# Patient Record
Sex: MALE | Race: WHITE | NOT HISPANIC OR LATINO | URBAN - METROPOLITAN AREA
[De-identification: names, ages, dates, MRNs, and addresses within clinical notes are randomized per-mention and may not be internally consistent; named-entity substitution may affect disease eponyms.]

---

## 2019-11-26 ENCOUNTER — APPOINTMENT (OUTPATIENT)
Age: 24
Setting detail: DERMATOLOGY
End: 2019-11-27

## 2019-11-26 VITALS — HEIGHT: 73 IN | WEIGHT: 200 LBS

## 2019-11-26 DIAGNOSIS — L30.9 DERMATITIS, UNSPECIFIED: ICD-10-CM

## 2019-11-26 PROCEDURE — OTHER TREATMENT REGIMEN: OTHER

## 2019-11-26 PROCEDURE — OTHER FOLLOW UP FOR NEXT VISIT: OTHER

## 2019-11-26 PROCEDURE — OTHER COUNSELING: OTHER

## 2019-11-26 PROCEDURE — 99203 OFFICE O/P NEW LOW 30 MIN: CPT

## 2019-11-26 PROCEDURE — OTHER MIPS QUALITY: OTHER

## 2019-11-26 ASSESSMENT — SEVERITY ASSESSMENT: SEVERITY: MILD TO MODERATE

## 2019-11-26 ASSESSMENT — BSA RASH: BSA RASH: 4

## 2019-11-26 ASSESSMENT — LOCATION SIMPLE DESCRIPTION DERM
LOCATION SIMPLE: CHEST
LOCATION SIMPLE: RIGHT CHEEK
LOCATION SIMPLE: LEFT LOWER BACK
LOCATION SIMPLE: LEFT UPPER BACK
LOCATION SIMPLE: ABDOMEN

## 2019-11-26 ASSESSMENT — LOCATION ZONE DERM
LOCATION ZONE: FACE
LOCATION ZONE: TRUNK

## 2019-11-26 ASSESSMENT — LOCATION DETAILED DESCRIPTION DERM
LOCATION DETAILED: LEFT LATERAL SUPERIOR CHEST
LOCATION DETAILED: LEFT SUPERIOR LATERAL UPPER BACK
LOCATION DETAILED: LEFT SUPERIOR LATERAL MIDBACK
LOCATION DETAILED: RIGHT SUPERIOR CENTRAL MALAR CHEEK
LOCATION DETAILED: LEFT LATERAL ABDOMEN

## 2019-11-26 ASSESSMENT — PAIN INTENSITY VAS: HOW INTENSE IS YOUR PAIN 0 BEING NO PAIN, 10 BEING THE MOST SEVERE PAIN POSSIBLE?: NO PAIN

## 2019-11-26 NOTE — PROCEDURE: TREATMENT REGIMEN
Plan: This appears to be consistent with some form of reactive dermatitis. It is asymptomatic but the topical steroid should lead to resolution. If no improvement patient will follow up for reevaluation and possible biopsy
Samples Given: Bryhali x2 QD, Nahomy info
Detail Level: Zone

## 2019-11-26 NOTE — HPI: RASH
What Type Of Note Output Would You Prefer (Optional)?: Standard Output
How Severe Is Your Rash?: moderate
Is This A New Presentation, Or A Follow-Up?: Rash
Additional History: Patient with a patchy, red, slightly scaly rash primarily on the left abdomen and left back for the past one month. Started small and has expanded. It is completely asymptomatic, sometimes it is more noticeable after exercise or shower. He did have a recent sinusitis and took an antihistamine but otherwise no new medications. No other skin surfaces are affected

## 2020-01-28 ENCOUNTER — APPOINTMENT (OUTPATIENT)
Age: 25
Setting detail: DERMATOLOGY
End: 2020-01-29

## 2020-01-28 VITALS — HEIGHT: 73 IN

## 2020-01-28 DIAGNOSIS — L30.9 DERMATITIS, UNSPECIFIED: ICD-10-CM

## 2020-01-28 DIAGNOSIS — L71.8 OTHER ROSACEA: ICD-10-CM

## 2020-01-28 PROBLEM — H91.90 UNSPECIFIED HEARING LOSS, UNSPECIFIED EAR: Status: ACTIVE | Noted: 2020-01-28

## 2020-01-28 PROCEDURE — 99214 OFFICE O/P EST MOD 30 MIN: CPT

## 2020-01-28 PROCEDURE — OTHER TREATMENT REGIMEN: OTHER

## 2020-01-28 PROCEDURE — OTHER MIPS QUALITY: OTHER

## 2020-01-28 PROCEDURE — OTHER FOLLOW UP FOR NEXT VISIT: OTHER

## 2020-01-28 PROCEDURE — OTHER COUNSELING: OTHER

## 2020-01-28 ASSESSMENT — LOCATION DETAILED DESCRIPTION DERM
LOCATION DETAILED: LEFT SUPERIOR CENTRAL MALAR CHEEK
LOCATION DETAILED: LEFT SUPERIOR LATERAL UPPER BACK
LOCATION DETAILED: RIGHT MEDIAL ZYGOMA
LOCATION DETAILED: LEFT SUPERIOR LATERAL MIDBACK
LOCATION DETAILED: LEFT LATERAL SUPERIOR CHEST
LOCATION DETAILED: LEFT MEDIAL ZYGOMA
LOCATION DETAILED: RIGHT SUPERIOR CENTRAL MALAR CHEEK
LOCATION DETAILED: LEFT LATERAL ABDOMEN

## 2020-01-28 ASSESSMENT — LOCATION ZONE DERM
LOCATION ZONE: FACE
LOCATION ZONE: TRUNK

## 2020-01-28 ASSESSMENT — LOCATION SIMPLE DESCRIPTION DERM
LOCATION SIMPLE: CHEST
LOCATION SIMPLE: LEFT LOWER BACK
LOCATION SIMPLE: ABDOMEN
LOCATION SIMPLE: LEFT ZYGOMA
LOCATION SIMPLE: RIGHT ZYGOMA
LOCATION SIMPLE: RIGHT CHEEK
LOCATION SIMPLE: LEFT CHEEK
LOCATION SIMPLE: LEFT UPPER BACK

## 2020-01-28 ASSESSMENT — SEVERITY ASSESSMENT OVERALL AMONG ALL PATIENTS: IN YOUR EXPERIENCE, AMONG ALL PATIENTS YOU HAVE SEEN WITH THIS CONDITION, HOW SEVERE IS THIS PATIENT'S CONDITION?: MILD

## 2020-01-28 NOTE — PROCEDURE: FOLLOW UP FOR NEXT VISIT
Scheduled For Follow Up In (Optional): 1 month
Detail Level: Simple
Detail Level: Detailed
Scheduled For Follow Up In (Optional): 2 months

## 2020-01-28 NOTE — PROCEDURE: TREATMENT REGIMEN
Samples Given: CeraVe Eczema Body Wash and Itch Relief Lotion
Detail Level: Simple
Samples Given: Soolantra Cr x2 QHS, Nahomy info
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 Possible rosacea. Patient will try the samples of the Soolantra and if he likes the medication he will call for a prescription. If not he can call for a different topical medication
Plan: Patient will follow up in office for a shave biopsy when he feels the irritation

## 2020-02-14 ENCOUNTER — RX ONLY (RX ONLY)
Age: 25
End: 2020-02-14

## 2020-02-14 RX ORDER — HALOBETASOL PROPIONATE 0.1 MG/G
THIN COAT LOTION TOPICAL QHS
Qty: 1 | Refills: 2 | Status: ERX | COMMUNITY
Start: 2020-02-14

## 2021-07-30 ENCOUNTER — APPOINTMENT (OUTPATIENT)
Dept: URBAN - METROPOLITAN AREA CLINIC 273 | Age: 26
Setting detail: DERMATOLOGY
End: 2021-07-30

## 2021-07-30 DIAGNOSIS — B36.0 PITYRIASIS VERSICOLOR: ICD-10-CM

## 2021-07-30 PROCEDURE — OTHER PRESCRIPTION MEDICATION MANAGEMENT: OTHER

## 2021-07-30 PROCEDURE — 99203 OFFICE O/P NEW LOW 30 MIN: CPT

## 2021-07-30 PROCEDURE — OTHER COUNSELING: OTHER

## 2021-07-30 PROCEDURE — OTHER PRESCRIPTION: OTHER

## 2021-07-30 RX ORDER — FLUCONAZOLE 200 MG/1
TABLET ORAL
Qty: 15 | Refills: 1 | Status: ERX | COMMUNITY
Start: 2021-07-30

## 2021-07-30 ASSESSMENT — LOCATION DETAILED DESCRIPTION DERM: LOCATION DETAILED: LEFT MID-UPPER BACK

## 2021-07-30 ASSESSMENT — LOCATION SIMPLE DESCRIPTION DERM: LOCATION SIMPLE: LEFT UPPER BACK

## 2021-07-30 ASSESSMENT — LOCATION ZONE DERM: LOCATION ZONE: TRUNK

## 2021-07-30 NOTE — PROCEDURE: PRESCRIPTION MEDICATION MANAGEMENT
Detail Level: Simple
Render In Strict Bullet Format?: No
Initiate Treatment: Use Selsun blue shampoo for preventative maintenance once it clears up

## 2021-07-30 NOTE — HPI: RASH
What Type Of Note Output Would You Prefer (Optional)?: Bullet Format
How Severe Is Your Rash?: mild
Is This A New Presentation, Or A Follow-Up?: Rash
Additional History: Tinea Versicolor has flared up again.